# Patient Record
Sex: FEMALE | Race: WHITE | Employment: UNEMPLOYED | URBAN - METROPOLITAN AREA
[De-identification: names, ages, dates, MRNs, and addresses within clinical notes are randomized per-mention and may not be internally consistent; named-entity substitution may affect disease eponyms.]

---

## 2019-10-07 ENCOUNTER — APPOINTMENT (OUTPATIENT)
Dept: GENERAL RADIOLOGY | Facility: CLINIC | Age: 34
End: 2019-10-07
Payer: MEDICARE

## 2019-10-07 ENCOUNTER — HOSPITAL ENCOUNTER (EMERGENCY)
Facility: CLINIC | Age: 34
Discharge: HOME | End: 2019-10-07
Attending: EMERGENCY MEDICINE
Payer: MEDICARE

## 2019-10-07 VITALS
OXYGEN SATURATION: 98 % | WEIGHT: 138 LBS | SYSTOLIC BLOOD PRESSURE: 128 MMHG | DIASTOLIC BLOOD PRESSURE: 71 MMHG | RESPIRATION RATE: 16 BRPM | HEIGHT: 62 IN | TEMPERATURE: 98.4 F | HEART RATE: 84 BPM | BODY MASS INDEX: 25.4 KG/M2

## 2019-10-07 DIAGNOSIS — M25.511 ACUTE PAIN OF RIGHT SHOULDER: Primary | ICD-10-CM

## 2019-10-07 PROCEDURE — 99283 EMERGENCY DEPT VISIT LOW MDM: CPT

## 2019-10-07 PROCEDURE — 6360000002 HC RX W HCPCS: Performed by: EMERGENCY MEDICINE

## 2019-10-07 PROCEDURE — 73030 X-RAY EXAM OF SHOULDER: CPT

## 2019-10-07 PROCEDURE — 96372 THER/PROPH/DIAG INJ SC/IM: CPT

## 2019-10-07 RX ORDER — KETOROLAC TROMETHAMINE 30 MG/ML
30 INJECTION, SOLUTION INTRAMUSCULAR; INTRAVENOUS ONCE
Status: COMPLETED | OUTPATIENT
Start: 2019-10-07 | End: 2019-10-07

## 2019-10-07 RX ADMIN — KETOROLAC TROMETHAMINE 30 MG: 30 INJECTION, SOLUTION INTRAMUSCULAR; INTRAVENOUS at 14:27

## 2019-10-07 ASSESSMENT — PAIN DESCRIPTION - LOCATION
LOCATION: SHOULDER
LOCATION: SHOULDER

## 2019-10-07 ASSESSMENT — PAIN DESCRIPTION - DESCRIPTORS: DESCRIPTORS: ACHING;THROBBING

## 2019-10-07 ASSESSMENT — PAIN SCALES - GENERAL
PAINLEVEL_OUTOF10: 7

## 2019-10-07 ASSESSMENT — PAIN DESCRIPTION - FREQUENCY: FREQUENCY: CONTINUOUS

## 2019-10-07 ASSESSMENT — PAIN DESCRIPTION - PAIN TYPE: TYPE: ACUTE PAIN

## 2019-10-07 ASSESSMENT — PAIN DESCRIPTION - ORIENTATION
ORIENTATION: RIGHT
ORIENTATION: RIGHT

## 2021-04-19 ENCOUNTER — HOSPITAL ENCOUNTER (EMERGENCY)
Facility: CLINIC | Age: 36
Discharge: HOME | End: 2021-04-19
Attending: EMERGENCY MEDICINE
Payer: MEDICARE

## 2021-04-19 VITALS
RESPIRATION RATE: 16 BRPM | SYSTOLIC BLOOD PRESSURE: 121 MMHG | OXYGEN SATURATION: 100 % | WEIGHT: 125 LBS | TEMPERATURE: 98.3 F | BODY MASS INDEX: 22.15 KG/M2 | DIASTOLIC BLOOD PRESSURE: 75 MMHG | HEIGHT: 63 IN

## 2021-04-19 DIAGNOSIS — D64.9 ANEMIA, UNSPECIFIED TYPE: Primary | ICD-10-CM

## 2021-04-19 DIAGNOSIS — R19.7 NAUSEA VOMITING AND DIARRHEA: ICD-10-CM

## 2021-04-19 DIAGNOSIS — R11.2 NAUSEA VOMITING AND DIARRHEA: ICD-10-CM

## 2021-04-19 LAB
ABSOLUTE EOS #: 0.36 K/UL (ref 0–0.4)
ABSOLUTE IMMATURE GRANULOCYTE: ABNORMAL K/UL (ref 0–0.3)
ABSOLUTE LYMPH #: 0.9 K/UL (ref 1–4.8)
ABSOLUTE MONO #: 0.45 K/UL (ref 0.1–0.8)
ALBUMIN SERPL-MCNC: 4.4 G/DL (ref 3.5–5.2)
ALBUMIN/GLOBULIN RATIO: 1.8 (ref 1–2.5)
ALP BLD-CCNC: 69 U/L (ref 35–104)
ALT SERPL-CCNC: 16 U/L (ref 5–33)
ANION GAP SERPL CALCULATED.3IONS-SCNC: 11 MMOL/L (ref 9–17)
AST SERPL-CCNC: 19 U/L
BASOPHILS # BLD: 1 % (ref 0–2)
BASOPHILS ABSOLUTE: 0.05 K/UL (ref 0–0.2)
BILIRUB SERPL-MCNC: <0.1 MG/DL (ref 0.3–1.2)
BUN BLDV-MCNC: 14 MG/DL (ref 6–20)
BUN/CREAT BLD: ABNORMAL (ref 9–20)
CALCIUM SERPL-MCNC: 9 MG/DL (ref 8.6–10.4)
CHLORIDE BLD-SCNC: 108 MMOL/L (ref 98–107)
CO2: 20 MMOL/L (ref 20–31)
CREAT SERPL-MCNC: 0.4 MG/DL (ref 0.5–0.9)
DIFFERENTIAL TYPE: ABNORMAL
EOSINOPHILS RELATIVE PERCENT: 8 % (ref 1–4)
GFR AFRICAN AMERICAN: >60 ML/MIN
GFR NON-AFRICAN AMERICAN: >60 ML/MIN
GFR SERPL CREATININE-BSD FRML MDRD: ABNORMAL ML/MIN/{1.73_M2}
GFR SERPL CREATININE-BSD FRML MDRD: ABNORMAL ML/MIN/{1.73_M2}
GLUCOSE BLD-MCNC: 96 MG/DL (ref 70–99)
HCG QUALITATIVE: NEGATIVE
HCT VFR BLD CALC: 32.1 % (ref 36–46)
HEMOGLOBIN: 9.8 G/DL (ref 12–16)
IMMATURE GRANULOCYTES: ABNORMAL %
LACTIC ACID: 0.6 MMOL/L (ref 0.5–2.2)
LYMPHOCYTES # BLD: 20 % (ref 24–44)
MCH RBC QN AUTO: 21.1 PG (ref 26–34)
MCHC RBC AUTO-ENTMCNC: 30.5 G/DL (ref 31–37)
MCV RBC AUTO: 69.3 FL (ref 80–100)
MONOCYTES # BLD: 10 % (ref 1–7)
MORPHOLOGY: ABNORMAL
NRBC AUTOMATED: ABNORMAL PER 100 WBC
PDW BLD-RTO: 18.9 % (ref 12.5–15.4)
PLATELET # BLD: 217 K/UL (ref 140–450)
PLATELET ESTIMATE: ABNORMAL
PMV BLD AUTO: 8.1 FL (ref 6–12)
POTASSIUM SERPL-SCNC: 4 MMOL/L (ref 3.7–5.3)
RBC # BLD: 4.64 M/UL (ref 4–5.2)
RBC # BLD: ABNORMAL 10*6/UL
SARS-COV-2: NORMAL
SARS-COV-2: NOT DETECTED
SEG NEUTROPHILS: 61 % (ref 36–66)
SEGMENTED NEUTROPHILS ABSOLUTE COUNT: 2.74 K/UL (ref 1.8–7.7)
SODIUM BLD-SCNC: 139 MMOL/L (ref 135–144)
SOURCE: NORMAL
TOTAL PROTEIN: 6.9 G/DL (ref 6.4–8.3)
WBC # BLD: 4.5 K/UL (ref 3.5–11)
WBC # BLD: ABNORMAL 10*3/UL

## 2021-04-19 PROCEDURE — U0003 INFECTIOUS AGENT DETECTION BY NUCLEIC ACID (DNA OR RNA); SEVERE ACUTE RESPIRATORY SYNDROME CORONAVIRUS 2 (SARS-COV-2) (CORONAVIRUS DISEASE [COVID-19]), AMPLIFIED PROBE TECHNIQUE, MAKING USE OF HIGH THROUGHPUT TECHNOLOGIES AS DESCRIBED BY CMS-2020-01-R: HCPCS

## 2021-04-19 PROCEDURE — 80053 COMPREHEN METABOLIC PANEL: CPT

## 2021-04-19 PROCEDURE — 6360000002 HC RX W HCPCS: Performed by: EMERGENCY MEDICINE

## 2021-04-19 PROCEDURE — 99283 EMERGENCY DEPT VISIT LOW MDM: CPT

## 2021-04-19 PROCEDURE — 84703 CHORIONIC GONADOTROPIN ASSAY: CPT

## 2021-04-19 PROCEDURE — 96374 THER/PROPH/DIAG INJ IV PUSH: CPT

## 2021-04-19 PROCEDURE — U0005 INFEC AGEN DETEC AMPLI PROBE: HCPCS

## 2021-04-19 PROCEDURE — 83605 ASSAY OF LACTIC ACID: CPT

## 2021-04-19 PROCEDURE — 85025 COMPLETE CBC W/AUTO DIFF WBC: CPT

## 2021-04-19 PROCEDURE — 36415 COLL VENOUS BLD VENIPUNCTURE: CPT

## 2021-04-19 PROCEDURE — 2580000003 HC RX 258: Performed by: EMERGENCY MEDICINE

## 2021-04-19 RX ORDER — ONDANSETRON 2 MG/ML
4 INJECTION INTRAMUSCULAR; INTRAVENOUS ONCE
Status: COMPLETED | OUTPATIENT
Start: 2021-04-19 | End: 2021-04-19

## 2021-04-19 RX ORDER — FERROUS SULFATE 325(65) MG
325 TABLET ORAL 2 TIMES DAILY
Qty: 60 TABLET | Refills: 0 | Status: SHIPPED | OUTPATIENT
Start: 2021-04-19

## 2021-04-19 RX ORDER — FERROUS SULFATE 325(65) MG
325 TABLET ORAL 2 TIMES DAILY
Qty: 60 TABLET | Refills: 0 | Status: SHIPPED | OUTPATIENT
Start: 2021-04-19 | End: 2021-04-19

## 2021-04-19 RX ORDER — 0.9 % SODIUM CHLORIDE 0.9 %
1000 INTRAVENOUS SOLUTION INTRAVENOUS ONCE
Status: COMPLETED | OUTPATIENT
Start: 2021-04-19 | End: 2021-04-19

## 2021-04-19 RX ADMIN — ONDANSETRON 4 MG: 2 INJECTION INTRAMUSCULAR; INTRAVENOUS at 10:23

## 2021-04-19 RX ADMIN — SODIUM CHLORIDE 1000 ML: 9 INJECTION, SOLUTION INTRAVENOUS at 10:23

## 2021-04-19 NOTE — ED PROVIDER NOTES
[address] ENCOUNTER      Pt Name: Justen Waddell  MRN: [id number]  [place] 1985  Date of evaluation: 4/19/2021      CHIEF COMPLAINT       Chief Complaint   Patient presents with    Diarrhea    Emesis         HISTORY OF PRESENT ILLNESS      The patient presents with vomiting and diarrhea. Her symptoms started on Saturday. She had both vomiting and diarrhea on Saturday. It is now Monday. She says now she just has diarrhea like water. She says her urine is dark. She is having minimal abdominal discomfort however. She denies blood in her stool or emesis. She denies fever. Has had a prior appendectomy but no other abdominal surgeries. She wants to be tested for Covid. Her son had a similar illness recently but he has not been tested. REVIEW OF SYSTEMS       All systems reviewed and negative unless noted in HPI. The patient denies fever or constitutional symptoms. Denies vision change. Denies any sore throat or rhinorrhea. Denies any neck pain or stiffness. Denies chest pain or shortness of breath. Vomiting and diarrhea with minimal abdominal discomfort. Denies any dysuria. Denies urinary frequency or hematuria. Denies musculoskeletal injury or pain. Denies any weakness, numbness or focal neurologic deficit. Denies any skin rash or edema. No recent psychiatric issues. No easy bruising or bleeding. Denies any polyuria, polydypsia or history of immunocompromise. PAST MEDICAL HISTORY    has a past medical history of Anxiety, Back pain, chronic, and Depression. SURGICAL HISTORY      has a past surgical history that includes Hysterectomy. CURRENT MEDICATIONS       Previous Medications    No medications on file       ALLERGIES     is allergic to vicodin [hydrocodone-acetaminophen]. FAMILY HISTORY     She indicated that the status of her mother is unknown.  She indicated that the status of her sister is Ref Range    WBC 4.5 3.5 - 11.0 k/uL    RBC 4.64 4.0 - 5.2 m/uL    Hemoglobin 9.8 (L) 12.0 - 16.0 g/dL    Hematocrit 32.1 (L) 36 - 46 %    MCV 69.3 (L) 80 - 100 fL    MCH 21.1 (L) 26 - 34 pg    MCHC 30.5 (L) 31 - 37 g/dL    RDW 18.9 (H) 12.5 - 15.4 %    Platelets 286 500 - 131 k/uL    MPV 8.1 6.0 - 12.0 fL    NRBC Automated NOT REPORTED per 100 WBC    Differential Type NOT REPORTED     Immature Granulocytes NOT REPORTED 0 %    Absolute Immature Granulocyte NOT REPORTED 0.00 - 0.30 k/uL    WBC Morphology NOT REPORTED     RBC Morphology NOT REPORTED     Platelet Estimate NOT REPORTED     Seg Neutrophils 61 36 - 66 %    Lymphocytes 20 (L) 24 - 44 %    Monocytes 10 (H) 1 - 7 %    Eosinophils % 8 (H) 1 - 4 %    Basophils 1 0 - 2 %    Segs Absolute 2.74 1.8 - 7.7 k/uL    Absolute Lymph # 0.90 (L) 1.0 - 4.8 k/uL    Absolute Mono # 0.45 0.1 - 0.8 k/uL    Absolute Eos # 0.36 0.0 - 0.4 k/uL    Basophils Absolute 0.05 0.0 - 0.2 k/uL    Morphology MICROCYTOSIS PRESENT     Morphology ANISOCYTOSIS PRESENT     Morphology INCREASED BANDS PRESENT     Morphology 1+ ELLIPTOCYTES     Morphology HYPOCHROMIA PRESENT    Comprehensive Metabolic Panel   Result Value Ref Range    Glucose 96 70 - 99 mg/dL    BUN 14 6 - 20 mg/dL    CREATININE 0.40 (L) 0.50 - 0.90 mg/dL    Bun/Cre Ratio NOT REPORTED 9 - 20    Calcium 9.0 8.6 - 10.4 mg/dL    Sodium 139 135 - 144 mmol/L    Potassium 4.0 3.7 - 5.3 mmol/L    Chloride 108 (H) 98 - 107 mmol/L    CO2 20 20 - 31 mmol/L    Anion Gap 11 9 - 17 mmol/L    Alkaline Phosphatase 69 35 - 104 U/L    ALT 16 5 - 33 U/L    AST 19 <32 U/L    Total Bilirubin <0.10 (L) 0.3 - 1.2 mg/dL    Total Protein 6.9 6.4 - 8.3 g/dL    Albumin 4.4 3.5 - 5.2 g/dL    Albumin/Globulin Ratio 1.8 1.0 - 2.5    GFR Non-African American >60 >60 mL/min    GFR African American >60 >60 mL/min    GFR Comment          GFR Staging NOT REPORTED    Lactic Acid   Result Value Ref Range    Lactic Acid 0.6 0.5 - 2.2 mmol/L         EMERGENCY

## 2021-04-19 NOTE — LETTER
[facility] ED  [address] [place] [id number]  Phone: [phone]               April 19, 2021    Patient: Portia Age   YOB: 1985   Date of Visit: 4/19/2021       To Whom It May Concern:    Delaney Landry was seen and treated in our emergency department on 4/19/2021. She may return to work on 04/20/21.       Sincerely,       Joe Cedeno MD         Signature:__________________________________

## 2021-07-01 ENCOUNTER — HOSPITAL ENCOUNTER (EMERGENCY)
Facility: CLINIC | Age: 36
Discharge: HOME | End: 2021-07-01
Attending: EMERGENCY MEDICINE
Payer: MEDICARE

## 2021-07-01 VITALS
WEIGHT: 130 LBS | TEMPERATURE: 98.6 F | BODY MASS INDEX: 23.92 KG/M2 | RESPIRATION RATE: 15 BRPM | DIASTOLIC BLOOD PRESSURE: 63 MMHG | OXYGEN SATURATION: 98 % | HEIGHT: 62 IN | SYSTOLIC BLOOD PRESSURE: 127 MMHG | HEART RATE: 83 BPM

## 2021-07-01 DIAGNOSIS — S61.011A LACERATION OF RIGHT THUMB WITHOUT FOREIGN BODY WITHOUT DAMAGE TO NAIL, INITIAL ENCOUNTER: Primary | ICD-10-CM

## 2021-07-01 PROCEDURE — 99283 EMERGENCY DEPT VISIT LOW MDM: CPT

## 2021-07-01 ASSESSMENT — PAIN DESCRIPTION - DESCRIPTORS: DESCRIPTORS: ACHING

## 2021-07-01 ASSESSMENT — PAIN DESCRIPTION - ORIENTATION: ORIENTATION: LEFT

## 2021-07-01 ASSESSMENT — PAIN SCALES - GENERAL: PAINLEVEL_OUTOF10: 3

## 2021-07-01 NOTE — ED PROVIDER NOTES
[facility]  [address] [facility]  Phone: [address]      Pt Name: Simona Cheney  MRN: [id number]  [place] 1985  Date of evaluation: 7/1/2021    CHIEF COMPLAINT       Chief Complaint   Patient presents with    Laceration     left thumb- pt was cutting zip strip and sliced her thumb with a pocket knife        HISTORY OF PRESENT ILLNESS    Simona Cheney is a 29 y.o. female who presents stab wound with a clean knife while fishing earlier today. States she is right-hand dominant and with a brand-new knife stabbed her left thumb on the volar surface there was immediate bleeding. REVIEW OF SYSTEMS     Constitutional: No fevers or chills   HEENT: No sore throat, rhinorrhea, or earache   Eyes: No blurry vision or double vision no drainage   Cardiovascular: No chest pain or tachycardia   Respiratory: No wheezing or shortness of breath no cough   Gastrointestinal: No nausea, vomiting, diarrhea, constipation, or abdominal pain   : No hematuria or dysuria   Musculoskeletal: No swelling or pain   Skin: See above  Neurological: No focal neurologic complaints, paresthesias, weakness, or headache   PAST MEDICAL HISTORY    has a past medical history of Anxiety, Back pain, chronic, and Depression. SURGICAL HISTORY      has a past surgical history that includes Hysterectomy. CURRENT MEDICATIONS       Previous Medications    FERROUS SULFATE (IRON 325) 325 (65 FE) MG TABLET    Take 1 tablet by mouth 2 times daily       ALLERGIES     is allergic to vicodin [hydrocodone-acetaminophen]. FAMILY HISTORY     She indicated that the status of her mother is unknown. She indicated that the status of her sister is unknown. She indicated that the status of her maternal grandmother is unknown. She indicated that the status of her maternal grandfather is unknown.  She indicated that the status of her maternal aunt is unknown.     family history includes Cancer in her maternal aunt, maternal grandmother, mother, and sister; Heart Disease in her maternal grandfather. SOCIAL HISTORY      reports that she has been smoking cigarettes. She has a 5.00 pack-year smoking history. She has never used smokeless tobacco. She reports current drug use. Drug: Methamphetamines. She reports that she does not drink alcohol. PHYSICAL EXAM       ED Triage Vitals   BP Temp Temp src Pulse Resp SpO2 Height Weight   07/01/21 1517 07/01/21 1517 -- 07/01/21 1517 07/01/21 1518 07/01/21 1518 07/01/21 1517 07/01/21 1517   127/63 98.6 °F (37 °C)  83 15 98 % 5' 2\" (1.575 m) 130 lb (59 kg)     Constitutional: Alert, oriented x3, nontoxic, answering questions appropriately, acting properly for age, in no acute distress   HEENT: Extraocular muscles intact, mucous membranes moist. Pupils equal, round, reactive to light, TMs clear bilaterally, no posterior pharyngeal erythema or exudates. Neck: Trachea midline, supple without lymphadenopathy, no posterior midline neck tenderness to palpation   Musculoskeletal: No extremity pain or swelling   Neurologic: Moving all 4 extremities without difficulty   Skin: Warm and dry   Superficial laceration in the base of the R surface of the left thumb with no active bleeding. DIFFERENTIAL DIAGNOSIS/ MDM:         DIAGNOSTIC RESULTS     EKG: All EKG's are interpreted by the Emergency Department Physician who either signs or Co-signs this chart in the absence of a cardiologist.        Not indicated unless otherwise documented above    LABS:  No results found for this visit on 07/01/21. Not indicated unless otherwise documented above    RADIOLOGY:   I reviewed the radiologist interpretations:    No orders to display       Not indicated unless otherwise documented above    EMERGENCY DEPARTMENT COURSE:     The patient was given the following medications:  No orders of the defined types were placed in this encounter.        Vitals:   -------------------------  /63 Pulse 83   Temp 98.6 °F (37 °C)   Resp 15   Ht 5' 2\" (1.575 m)   Wt 59 kg (130 lb)   SpO2 98%   BMI 23.78 kg/m²         I have reviewed the disposition diagnosis with the patient and or their family/guardian. I have answered their questions and given discharge instructions. They voiced understanding of these instructions and did not have any furtherquestions or complaints. CRITICAL CARE:    None    CONSULTS:    None    PROCEDURES:    None      OARRS Report if indicated             FINAL IMPRESSION      1.  Laceration of right thumb without foreign body without damage to nail, initial encounter          DISPOSITION/PLAN   DISPOSITION Decision To Discharge 07/01/2021 03:32:54 PM        CONDITION ON DISPOSITION: STABLE       PATIENT REFERRED TO:  [facility]  / [address]  [phone]    If symptoms worsen      DISCHARGE MEDICATIONS:  New Prescriptions    No medications on file       (Please note that portions of thisnote were completed with a voice recognition program.  Efforts were made to edit the dictations but occasionally words are mis-transcribed.)    Riccardo Mcclendon MD,, MD  Attending Emergency Physician        Riccardo Mcclendon MD  07/01/21 Jose Mireles

## 2021-07-01 NOTE — LETTER
[facility]  [address] [facility]  Phone: [phone]               July 1, 2021    Patient: Ceci Douglas   YOB: 1985   Date of Visit: 7/1/2021       To Whom It May Concern:    Kaci Llanes was seen and treated in our emergency department on 7/1/2021. She may return to work on 7/3/21.       Sincerely,       Manuel Galloway RN         Signature:__________________________________

## 2023-08-11 ENCOUNTER — HOSPITAL ENCOUNTER (EMERGENCY)
Facility: CLINIC | Age: 38
Discharge: HOME | End: 2023-08-11
Attending: STUDENT IN AN ORGANIZED HEALTH CARE EDUCATION/TRAINING PROGRAM
Payer: MEDICAID

## 2023-08-11 ENCOUNTER — APPOINTMENT (OUTPATIENT)
Dept: GENERAL RADIOLOGY | Facility: CLINIC | Age: 38
End: 2023-08-11
Payer: MEDICAID

## 2023-08-11 VITALS
TEMPERATURE: 98.3 F | WEIGHT: 140 LBS | OXYGEN SATURATION: 100 % | HEIGHT: 62 IN | HEART RATE: 70 BPM | DIASTOLIC BLOOD PRESSURE: 70 MMHG | RESPIRATION RATE: 16 BRPM | BODY MASS INDEX: 25.76 KG/M2 | SYSTOLIC BLOOD PRESSURE: 120 MMHG

## 2023-08-11 DIAGNOSIS — S63.657A SPRAIN OF METACARPOPHALANGEAL (MCP) JOINT OF LEFT LITTLE FINGER, INITIAL ENCOUNTER: Primary | ICD-10-CM

## 2023-08-11 PROCEDURE — 99283 EMERGENCY DEPT VISIT LOW MDM: CPT

## 2023-08-11 PROCEDURE — 73130 X-RAY EXAM OF HAND: CPT

## 2023-08-11 PROCEDURE — 6370000000 HC RX 637 (ALT 250 FOR IP): Performed by: NURSE PRACTITIONER

## 2023-08-11 RX ORDER — IBUPROFEN 600 MG/1
600 TABLET ORAL ONCE
Status: COMPLETED | OUTPATIENT
Start: 2023-08-11 | End: 2023-08-11

## 2023-08-11 RX ADMIN — IBUPROFEN 600 MG: 600 TABLET ORAL at 22:01

## 2023-08-11 ASSESSMENT — LIFESTYLE VARIABLES
HOW MANY STANDARD DRINKS CONTAINING ALCOHOL DO YOU HAVE ON A TYPICAL DAY: PATIENT DOES NOT DRINK
HOW OFTEN DO YOU HAVE A DRINK CONTAINING ALCOHOL: NEVER

## 2023-08-11 ASSESSMENT — PAIN - FUNCTIONAL ASSESSMENT: PAIN_FUNCTIONAL_ASSESSMENT: 0-10

## 2023-08-11 ASSESSMENT — PAIN SCALES - GENERAL: PAINLEVEL_OUTOF10: 4

## 2023-08-12 NOTE — DISCHARGE INSTRUCTIONS
Rest, ice, elevate. Return to the ER for any worsening symptoms, swelling, pain not controlled with tylenol and motrin, or any other worsening symptoms or concerns.

## 2023-08-12 NOTE — ED PROVIDER NOTES
[id number] [facility]  EMERGENCY DEPARTMENT ENCOUNTER      Pt Name: Johnathan Nguyen  MRN: [id number]  [address] [facility] 1985  Date of evaluation: 8/11/2023  Provider: Trupti Taveras APRN - [facility]       Chief Complaint   Patient presents with    Finger Pain    Hand Pain     Left hand          HISTORY OF PRESENT ILLNESS   (Location/Symptom, Timing/Onset, Context/Setting, Quality, Duration, Modifying Factors, Severity)  Note limiting factors. Johnathan Nguyen is a 41 y.o. female who presents to the emergency department for evaluation of left hand pain. Patient states that she was playing with her nephew and they both went to hit a ball at the same time that she bent her left pinky finger all the way back causing some pain. She has full range of motion. She has not taken anything to help alleviate the pain. She just reports some throbbing pain and wanted to get an x-ray to make sure it was not broken. No numbness, no tingling no laceration no injury to the wrist    HPI    Nursing Notes were reviewed. REVIEW OF SYSTEMS    (2-9 systems for level 4, 10 or more for level 5)     Review of Systems   Musculoskeletal:  Positive for arthralgias. Except as noted above the remainder of the review of systems was reviewed and negative.        PAST MEDICAL HISTORY     Past Medical History:   Diagnosis Date    Anxiety     Back pain, chronic 9/9/2014    Depression          SURGICAL HISTORY       Past Surgical History:   Procedure Laterality Date    HYSTERECTOMY           CURRENT MEDICATIONS       Discharge Medication List as of 8/11/2023  9:54 PM        CONTINUE these medications which have NOT CHANGED    Details   ferrous sulfate (IRON 325) 325 (65 Fe) MG tablet Take 1 tablet by mouth 2 times daily, Disp-60 tablet, R-0Normal             ALLERGIES     Vicodin [hydrocodone-acetaminophen]    FAMILY HISTORY       Family History   Problem Relation Age of Onset    Cancer Mother         breast    Cancer

## 2023-12-22 ENCOUNTER — HOSPITAL ENCOUNTER (EMERGENCY)
Facility: CLINIC | Age: 38
Discharge: HOME | End: 2023-12-22
Attending: EMERGENCY MEDICINE
Payer: MEDICAID

## 2023-12-22 VITALS
DIASTOLIC BLOOD PRESSURE: 88 MMHG | WEIGHT: 135 LBS | TEMPERATURE: 98.3 F | BODY MASS INDEX: 24.84 KG/M2 | HEIGHT: 62 IN | SYSTOLIC BLOOD PRESSURE: 147 MMHG | HEART RATE: 89 BPM | OXYGEN SATURATION: 98 % | RESPIRATION RATE: 16 BRPM

## 2023-12-22 DIAGNOSIS — H66.002 NON-RECURRENT ACUTE SUPPURATIVE OTITIS MEDIA OF LEFT EAR WITHOUT SPONTANEOUS RUPTURE OF TYMPANIC MEMBRANE: Primary | ICD-10-CM

## 2023-12-22 PROCEDURE — 6370000000 HC RX 637 (ALT 250 FOR IP): Performed by: EMERGENCY MEDICINE

## 2023-12-22 PROCEDURE — 99283 EMERGENCY DEPT VISIT LOW MDM: CPT

## 2023-12-22 RX ORDER — AMOXICILLIN 250 MG/1
500 CAPSULE ORAL ONCE
Status: COMPLETED | OUTPATIENT
Start: 2023-12-22 | End: 2023-12-22

## 2023-12-22 RX ORDER — AMOXICILLIN 500 MG/1
500 CAPSULE ORAL 3 TIMES DAILY
Qty: 21 CAPSULE | Refills: 0 | Status: SHIPPED | OUTPATIENT
Start: 2023-12-22 | End: 2023-12-29

## 2023-12-22 RX ORDER — IBUPROFEN 600 MG/1
600 TABLET ORAL ONCE
Status: COMPLETED | OUTPATIENT
Start: 2023-12-22 | End: 2023-12-22

## 2023-12-22 RX ADMIN — AMOXICILLIN 500 MG: 250 CAPSULE ORAL at 21:15

## 2023-12-22 RX ADMIN — IBUPROFEN 600 MG: 600 TABLET ORAL at 21:15

## 2023-12-23 NOTE — DISCHARGE INSTRUCTIONS
Take your medication as indicated and prescribed. Take your antibiotics as prescribed. For pain use acetaminophen (Tylenol) or ibuprofen (Motrin / Advil), unless prescribed medications that have acetaminophen or ibuprofen (or similar medications) in it. You can take over the counter acetaminophen tablets (1 - 2 tablets of the 500-mg strength every 6 hours) or ibuprofen tablets (2 tablets every 4 hours). You can use over the counter allergy medication (Allegra, Claritan, Zyrtec, etc) to help with the symptoms. Drink plenty of water. Avoid drinking alcohol or drinks that have caffeine. Placing a humidifier in your room at night may be beneficial for helping with nasal congestion. PLEASE RETURN TO THE EMERGENCY DEPARTMENT IMMEDIATELY for worsening symptoms, persistent fever, nausea and/or vomiting, or if you develop any concerning symptoms such as: high fever not relieved by acetaminophen (Tylenol) and/or ibuprofen (Motrin / Advil), chills, shortness of breath, chest pain, feeling of your heart fluttering or racing, loss of consciousness, numbness, weakness or tingling in the arms or legs or change in color of the extremities, changes in mental status, persistent headache, blurry vision, loss of bladder / bowel control, unable to follow up with your physician, or other any other care or concern.

## 2023-12-23 NOTE — ED PROVIDER NOTES
[facility]  [address]  Phone: [phone]      Pt Name: Johnathan Nguyen  [facility]:[id number]  [address] [facility] 1985  Date of evaluation: 12/22/2023      CHIEF COMPLAINT       Chief Complaint   Patient presents with    Lisa Moles ILLNESS   Johnathan Nguyen is a 41 y.o. female with history of depression, lumbar herniated disc, and right-sided sciatica who presents for evaluation of left ear pain. The patient reports that she has had upper respiratory symptoms for the past 1 week and today around 8 PM she felt a \"pop\" in her left ear. She subsequently developed gradual onset, constant, progressive, sharp, stabbing, nonradiating, left ear pain with diminished hearing. She denies any drainage or bleeding from her left ear. The patient denies any trauma to her head or ear. She has been taking sinus and cold over-the-counter medications without improvement. The patient denies fever, chills, headache, vision changes, neck pain, back pain, chest pain, shortness of breath, abdominal pain, sore throat, urinary/bowel symptoms, recent injury. REVIEW OF SYSTEMS     Positive: Left ear pain  Ten point review of systems was reviewed and is negative unless otherwise noted in the HPI    PAST MEDICAL HISTORY    has a past medical history of Anxiety, Back pain, chronic, Depression, and RLS (restless legs syndrome). SURGICAL HISTORY      has a past surgical history that includes Hysterectomy. CURRENT MEDICATIONS       Discharge Medication List as of 12/22/2023  9:03 PM        CONTINUE these medications which have NOT CHANGED    Details   ferrous sulfate (IRON 325) 325 (65 Fe) MG tablet Take 1 tablet by mouth 2 times daily, Disp-60 tablet, R-0Normal             ALLERGIES     is allergic to vicodin [hydrocodone-acetaminophen]. FAMILY HISTORY     She indicated that the status of her mother is unknown. She indicated that the status of her sister is unknown.  She indicated that